# Patient Record
Sex: FEMALE | Race: WHITE | NOT HISPANIC OR LATINO
[De-identification: names, ages, dates, MRNs, and addresses within clinical notes are randomized per-mention and may not be internally consistent; named-entity substitution may affect disease eponyms.]

---

## 2021-05-25 ENCOUNTER — NON-APPOINTMENT (OUTPATIENT)
Age: 41
End: 2021-05-25

## 2021-05-25 ENCOUNTER — APPOINTMENT (OUTPATIENT)
Dept: INTERNAL MEDICINE | Facility: CLINIC | Age: 41
End: 2021-05-25
Payer: COMMERCIAL

## 2021-05-25 VITALS
SYSTOLIC BLOOD PRESSURE: 127 MMHG | DIASTOLIC BLOOD PRESSURE: 84 MMHG | RESPIRATION RATE: 12 BRPM | HEIGHT: 66 IN | OXYGEN SATURATION: 100 % | BODY MASS INDEX: 35.36 KG/M2 | HEART RATE: 53 BPM | WEIGHT: 220 LBS

## 2021-05-25 VITALS — DIASTOLIC BLOOD PRESSURE: 80 MMHG | SYSTOLIC BLOOD PRESSURE: 122 MMHG

## 2021-05-25 DIAGNOSIS — Z80.41 FAMILY HISTORY OF MALIGNANT NEOPLASM OF OVARY: ICD-10-CM

## 2021-05-25 DIAGNOSIS — Z78.9 OTHER SPECIFIED HEALTH STATUS: ICD-10-CM

## 2021-05-25 DIAGNOSIS — K82.4 CHOLESTEROLOSIS OF GALLBLADDER: ICD-10-CM

## 2021-05-25 DIAGNOSIS — E78.00 PURE HYPERCHOLESTEROLEMIA, UNSPECIFIED: ICD-10-CM

## 2021-05-25 DIAGNOSIS — Z80.3 FAMILY HISTORY OF MALIGNANT NEOPLASM OF BREAST: ICD-10-CM

## 2021-05-25 DIAGNOSIS — Z00.00 ENCOUNTER FOR GENERAL ADULT MEDICAL EXAMINATION W/OUT ABNORMAL FINDINGS: ICD-10-CM

## 2021-05-25 DIAGNOSIS — Z13.71 ENCOUNTER FOR NONPROCREATIVE SCREENING FOR GENETIC DISEASE CARRIER STATUS: ICD-10-CM

## 2021-05-25 DIAGNOSIS — K90.0 CELIAC DISEASE: ICD-10-CM

## 2021-05-25 DIAGNOSIS — Z82.49 FAMILY HISTORY OF ISCHEMIC HEART DISEASE AND OTHER DISEASES OF THE CIRCULATORY SYSTEM: ICD-10-CM

## 2021-05-25 DIAGNOSIS — E03.9 HYPOTHYROIDISM, UNSPECIFIED: ICD-10-CM

## 2021-05-25 PROCEDURE — G0442 ANNUAL ALCOHOL SCREEN 15 MIN: CPT

## 2021-05-25 PROCEDURE — G0444 DEPRESSION SCREEN ANNUAL: CPT | Mod: 59

## 2021-05-25 PROCEDURE — 93000 ELECTROCARDIOGRAM COMPLETE: CPT | Mod: 59

## 2021-05-25 PROCEDURE — 99396 PREV VISIT EST AGE 40-64: CPT | Mod: 25

## 2021-05-25 PROCEDURE — 99072 ADDL SUPL MATRL&STAF TM PHE: CPT

## 2021-05-25 PROCEDURE — 36415 COLL VENOUS BLD VENIPUNCTURE: CPT

## 2021-05-25 PROCEDURE — 99213 OFFICE O/P EST LOW 20 MIN: CPT | Mod: 25

## 2021-05-25 NOTE — PHYSICAL EXAM
[No Acute Distress] : no acute distress [Well Nourished] : well nourished [Well Developed] : well developed [Well-Appearing] : well-appearing [Normal Sclera/Conjunctiva] : normal sclera/conjunctiva [No JVD] : no jugular venous distention [Thyroid Normal, No Nodules] : the thyroid was normal and there were no nodules present [No Respiratory Distress] : no respiratory distress  [No Accessory Muscle Use] : no accessory muscle use [Clear to Auscultation] : lungs were clear to auscultation bilaterally [Normal Rate] : normal rate  [Regular Rhythm] : with a regular rhythm [Normal S1, S2] : normal S1 and S2 [No Murmur] : no murmur heard [No Carotid Bruits] : no carotid bruits [Normal Appearance] : normal in appearance [No Masses] : no palpable masses [No Axillary Lymphadenopathy] : no axillary lymphadenopathy [Soft] : abdomen soft [Non Tender] : non-tender [Non-distended] : non-distended [Normal Bowel Sounds] : normal bowel sounds [Normal Supraclavicular Nodes] : no supraclavicular lymphadenopathy [Normal Axillary Nodes] : no axillary lymphadenopathy [Normal Posterior Cervical Nodes] : no posterior cervical lymphadenopathy [Normal Anterior Cervical Nodes] : no anterior cervical lymphadenopathy [No Spinal Tenderness] : no spinal tenderness [Normal Gait] : normal gait [Alert and Oriented x3] : oriented to person, place, and time

## 2021-05-25 NOTE — ASSESSMENT
[FreeTextEntry1] : 1. CPE\par Check CBC, CMP, A1c, lipids, UA\par EKG is SB, stable RSR in V1\par UTD with mammo, PAP\par \par 2. Hypothyroidism\par Check TFTs\par \par 3. Celiac Disease\par Check iron panel, B12, folate, Vit D\par Advised family screening for children\par Should follow with GI\par \par 4. Gallbladder polyp\par F/U abdominal sono\par

## 2021-05-25 NOTE — HISTORY OF PRESENT ILLNESS
[FreeTextEntry1] : CPE, elevated cholesterol [de-identified] : Melani presents for CPE.\par \par She has been feeling well. She was recently diagnosed with Celiac Disease. She has been following gluten free diet. Endo diagnosed her. She had never seen GI. She had work CPE recently and has elevated lipids.

## 2021-05-25 NOTE — HEALTH RISK ASSESSMENT
[] : No [Yes] : Yes [2 - 4 times a month (2 pts)] : 2-4 times a month (2 points) [1 or 2 (0 pts)] : 1 or 2 (0 points) [Never (0 pts)] : Never (0 points) [0] : 2) Feeling down, depressed, or hopeless: Not at all (0) [[id number]] : 0 [Patient reported mammogram was normal] : Patient reported mammogram was normal [Patient reported PAP Smear was normal] : Patient reported PAP Smear was normal [With Family] : lives with family [Employed] : employed [] :  [Fully functional (bathing, dressing, toileting, transferring, walking, feeding)] : Fully functional (bathing, dressing, toileting, transferring, walking, feeding) [Fully functional (using the telephone, shopping, preparing meals, housekeeping, doing laundry, using] : Fully functional and needs no help or supervision to perform IADLs (using the telephone, shopping, preparing meals, housekeeping, doing laundry, using transportation, managing medications and managing finances) [MammogramComments] : Through GYN. will get records [PapSmearComments] : UTD with GYN, Dr. Yadav

## 2021-05-25 NOTE — REVIEW OF SYSTEMS
[Fever] : no fever [Chills] : no chills [Fatigue] : no fatigue [Night Sweats] : no night sweats [Vision Problems] : no vision problems [Nasal Discharge] : no nasal discharge [Sore Throat] : no sore throat [Chest Pain] : no chest pain [Palpitations] : no palpitations [Shortness Of Breath] : no shortness of breath [Wheezing] : no wheezing [Cough] : no cough [Abdominal Pain] : no abdominal pain [Nausea] : no nausea [Constipation] : no constipation [Diarrhea] : diarrhea [Vomiting] : no vomiting [Dysuria] : no dysuria [Hematuria] : no hematuria [Joint Pain] : no joint pain [Headache] : no headache [Dizziness] : no dizziness [Anxiety] : no anxiety [Depression] : no depression

## 2021-06-02 LAB
25(OH)D3 SERPL-MCNC: 33 NG/ML
ALBUMIN SERPL ELPH-MCNC: 4.5 G/DL
ALP BLD-CCNC: 56 U/L
ALT SERPL-CCNC: 15 U/L
ANION GAP SERPL CALC-SCNC: 12 MMOL/L
AST SERPL-CCNC: 19 U/L
BASOPHILS # BLD AUTO: 0.05 K/UL
BASOPHILS NFR BLD AUTO: 0.7 %
BILIRUB SERPL-MCNC: 0.4 MG/DL
BUN SERPL-MCNC: 8 MG/DL
CALCIUM SERPL-MCNC: 9.6 MG/DL
CHLORIDE SERPL-SCNC: 103 MMOL/L
CHOLEST SERPL-MCNC: 180 MG/DL
CO2 SERPL-SCNC: 25 MMOL/L
CREAT SERPL-MCNC: 0.78 MG/DL
EOSINOPHIL # BLD AUTO: 0.09 K/UL
EOSINOPHIL NFR BLD AUTO: 1.2 %
ESTIMATED AVERAGE GLUCOSE: 108 MG/DL
FERRITIN SERPL-MCNC: 7 NG/ML
FOLATE SERPL-MCNC: 17.5 NG/ML
GLUCOSE SERPL-MCNC: 94 MG/DL
HBA1C MFR BLD HPLC: 5.4 %
HCT VFR BLD CALC: 40.1 %
HDLC SERPL-MCNC: 67 MG/DL
HGB BLD-MCNC: 12.4 G/DL
IMM GRANULOCYTES NFR BLD AUTO: 0.3 %
IRON SATN MFR SERPL: 10 %
IRON SERPL-MCNC: 41 UG/DL
LDLC SERPL CALC-MCNC: 100 MG/DL
LYMPHOCYTES # BLD AUTO: 1.91 K/UL
LYMPHOCYTES NFR BLD AUTO: 25.9 %
MAN DIFF?: NORMAL
MCHC RBC-ENTMCNC: 28.1 PG
MCHC RBC-ENTMCNC: 30.9 GM/DL
MCV RBC AUTO: 90.7 FL
MONOCYTES # BLD AUTO: 0.55 K/UL
MONOCYTES NFR BLD AUTO: 7.5 %
NEUTROPHILS # BLD AUTO: 4.76 K/UL
NEUTROPHILS NFR BLD AUTO: 64.4 %
NONHDLC SERPL-MCNC: 113 MG/DL
PLATELET # BLD AUTO: 249 K/UL
POTASSIUM SERPL-SCNC: 4.4 MMOL/L
PROT SERPL-MCNC: 7.4 G/DL
RBC # BLD: 4.42 M/UL
RBC # FLD: 14.2 %
SODIUM SERPL-SCNC: 140 MMOL/L
T4 FREE SERPL-MCNC: 1.5 NG/DL
TIBC SERPL-MCNC: 423 UG/DL
TRIGL SERPL-MCNC: 66 MG/DL
TSH SERPL-ACNC: 2.02 UIU/ML
UIBC SERPL-MCNC: 382 UG/DL
VIT B12 SERPL-MCNC: 577 PG/ML
WBC # FLD AUTO: 7.38 K/UL

## 2022-02-11 ENCOUNTER — APPOINTMENT (OUTPATIENT)
Dept: INTERNAL MEDICINE | Facility: CLINIC | Age: 42
End: 2022-02-11
Payer: COMMERCIAL

## 2022-02-11 VITALS
DIASTOLIC BLOOD PRESSURE: 82 MMHG | HEIGHT: 66 IN | HEART RATE: 65 BPM | BODY MASS INDEX: 31.82 KG/M2 | RESPIRATION RATE: 12 BRPM | SYSTOLIC BLOOD PRESSURE: 131 MMHG | OXYGEN SATURATION: 100 % | WEIGHT: 198 LBS

## 2022-02-11 DIAGNOSIS — U07.1 COVID-19: ICD-10-CM

## 2022-02-11 PROCEDURE — 99213 OFFICE O/P EST LOW 20 MIN: CPT

## 2022-06-09 ENCOUNTER — NON-APPOINTMENT (OUTPATIENT)
Age: 42
End: 2022-06-09

## 2023-01-03 ENCOUNTER — APPOINTMENT (OUTPATIENT)
Dept: CARDIOLOGY | Facility: CLINIC | Age: 43
End: 2023-01-03
Payer: COMMERCIAL

## 2023-01-03 ENCOUNTER — NON-APPOINTMENT (OUTPATIENT)
Age: 43
End: 2023-01-03

## 2023-01-03 VITALS
OXYGEN SATURATION: 99 % | HEIGHT: 66 IN | DIASTOLIC BLOOD PRESSURE: 72 MMHG | BODY MASS INDEX: 36.16 KG/M2 | WEIGHT: 225 LBS | HEART RATE: 64 BPM | SYSTOLIC BLOOD PRESSURE: 110 MMHG

## 2023-01-03 DIAGNOSIS — I49.3 VENTRICULAR PREMATURE DEPOLARIZATION: ICD-10-CM

## 2023-01-03 PROCEDURE — 99204 OFFICE O/P NEW MOD 45 MIN: CPT | Mod: 25

## 2023-01-03 PROCEDURE — 93000 ELECTROCARDIOGRAM COMPLETE: CPT

## 2023-01-03 RX ORDER — LEVOTHYROXINE SODIUM 75 UG/1
75 TABLET ORAL
Refills: 0 | Status: ACTIVE | COMMUNITY

## 2023-01-03 NOTE — HISTORY OF PRESENT ILLNESS
[FreeTextEntry1] : Melani is a 42 year old female here for evaluation.\par \par She is generally healthy.  Her past medical history is notable for celiac disease, hypothyroidism, and anemia.  She saw cardiologist about 15 years ago because of palpitations, and was diagnosed with PVCs.  She had a normal echocardiogram and stress test at this time.\par \par She has no chest pain, difficulty breathing, or palpitations.  She has no family history of premature CAD, and denies all toxic habits.  She also denies lower extremity swelling, orthopnea, PND, dizziness, lightheadedness, and near syncope.  Her most recent LDL was 100.\par She uses her [employer] about 30 to 45 minutes every morning, without exertional symptoms.\par \par

## 2023-01-03 NOTE — DISCUSSION/SUMMARY
[FreeTextEntry1] : Melani is a 42-year-old female here for initial evaluation.  She has no worrisome exertional symptoms at this time, her LDL is at goal and she has no premature family history of CAD.  Overall, I consider her a low risk individual.\par \par She did have cardiac testing nearly 15 years ago, and seems to have had PVCs in the setting of caffeine use, which have since resolved.  I have stressed the importance of improving her diet, exercise, and weight loss, to reduce her overall cardiovascular risk.  We will hold off on additional testing at this time.\par \par She will let me know if any issues.  If doing well, I will see her again in 1 year.  We will consider a calcium score at this time.\par  [EKG obtained to assist in diagnosis and management of assessed problem(s)] : EKG obtained to assist in diagnosis and management of assessed problem(s)